# Patient Record
Sex: MALE | Race: WHITE | URBAN - METROPOLITAN AREA
[De-identification: names, ages, dates, MRNs, and addresses within clinical notes are randomized per-mention and may not be internally consistent; named-entity substitution may affect disease eponyms.]

---

## 2019-06-05 ENCOUNTER — OFFICE VISIT (OUTPATIENT)
Dept: FAMILY MEDICINE | Facility: CLINIC | Age: 59
End: 2019-06-05

## 2019-06-05 VITALS
HEART RATE: 60 BPM | BODY MASS INDEX: 23.13 KG/M2 | DIASTOLIC BLOOD PRESSURE: 64 MMHG | SYSTOLIC BLOOD PRESSURE: 102 MMHG | HEIGHT: 70 IN | WEIGHT: 161.6 LBS

## 2019-06-05 DIAGNOSIS — Z12.5 SCREENING FOR PROSTATE CANCER: ICD-10-CM

## 2019-06-05 DIAGNOSIS — Z29.9 ENCOUNTER FOR PREVENTIVE MEASURE: Primary | ICD-10-CM

## 2019-06-05 DIAGNOSIS — Z13.1 SCREENING FOR DIABETES MELLITUS: ICD-10-CM

## 2019-06-05 DIAGNOSIS — Z01.84 ANTIBODY RESPONSE EXAMINATION: ICD-10-CM

## 2019-06-05 DIAGNOSIS — Z13.220 SCREENING FOR LIPOID DISORDERS: ICD-10-CM

## 2019-06-05 DIAGNOSIS — N52.9 ERECTILE DYSFUNCTION, UNSPECIFIED ERECTILE DYSFUNCTION TYPE: ICD-10-CM

## 2019-06-05 DIAGNOSIS — R19.6 HALITOSIS: ICD-10-CM

## 2019-06-05 PROCEDURE — 99386 PREV VISIT NEW AGE 40-64: CPT | Performed by: FAMILY MEDICINE

## 2019-06-05 RX ORDER — TADALAFIL 20 MG/1
20 TABLET ORAL DAILY PRN
Qty: 15 TABLET | Refills: 11 | Status: SHIPPED | OUTPATIENT
Start: 2019-06-05 | End: 2021-03-09

## 2019-06-05 RX ORDER — CHLORHEXIDINE GLUCONATE ORAL RINSE 1.2 MG/ML
15 SOLUTION DENTAL 2 TIMES DAILY
Qty: 1893 ML | Refills: 11 | Status: SHIPPED | OUTPATIENT
Start: 2019-06-05 | End: 2020-10-14

## 2019-06-05 ASSESSMENT — MIFFLIN-ST. JEOR: SCORE: 1540.29

## 2019-06-05 NOTE — LETTER
[facility] [address] [place]  [address]  Phone: [phone]    Martha 10, 2019      Robles Chavez  [facility] [facility]  [place] [address]              Dear Robles,    The results from your recent visit showed elevation in your cholesterol compared to the previous measurements.   I would work hard on incorporating the mediterranean diet into your vernacular and repeat these tests in 6 months in the office.  Medication may be necessary if diet and exercise are not enough to lower this successfully.        Sincerely,         Michael Seth M.D.    Results for orders placed or performed in visit on 06/05/19   Comp. Metabolic Panel (14) ([facility])   Result Value Ref Range    Glucose 104 (H) 65 - 99 mg/dL    Urea Nitrogen 20 8 - 27 mg/dL    Creatinine 0.95 0.76 - 1.27 mg/dL    eGFR If NonAfricn Am 86 >59 mL/min/1.73    eGFR If Africn Am 99 >59 mL/min/1.73    BUN/Creatinine Ratio 21 10 - 24    Sodium 143 134 - 144 mmol/L    Potassium 4.5 3.5 - 5.2 mmol/L    Chloride 105 96 - 106 mmol/L    Total CO2 27 20 - 29 mmol/L    Calcium 9.3 8.6 - 10.2 mg/dL    Protein Total 6.8 6.0 - 8.5 g/dL    Albumin 4.7 3.6 - 4.8 g/dL    Globulin, Total 2.1 1.5 - 4.5 g/dL    A/G Ratio 2.2 1.2 - 2.2    Bilirubin Total 0.4 0.0 - 1.2 mg/dL    Alkaline Phosphatase 61 39 - 117 IU/L    AST 22 0 - 40 IU/L    ALT 27 0 - 44 IU/L    Narrative    Performed at:  01 - [facility] [address], [place], [place]  [id number]  : Nacho Martin MD, Phone:  [phone]   Lipid Panel ([facility])   Result Value Ref Range    Cholesterol 238 (H) 100 - 199 mg/dL    Triglycerides 63 0 - 149 mg/dL    HDL Cholesterol 67 >39 mg/dL    VLDL Cholesterol Syed 13 5 - 40 mg/dL    LDL Cholesterol Calculated 158 (H) 0 - 99 mg/dL    LDL/HDL Ratio 2.4 0.0 - 3.6 ratio    Narrative    Performed at:  01 - [facility]  [address] [facility], [place], [place]  [id number]  : Nacho Martin MD, Phone:  [phone]   Measles[facility]Mumps/Rubella  Immunity ([facility])   Result Value Ref Range    Rubella Antibody IgG 9.23 Immune >0.99 index    Rubeola IgG Antibody 100.0 Immune >29.9 AU/mL    Mumps IgG Antibody 87.1 Immune >10.9 AU/mL    Narrative    Performed at:  01 - [facility] [address], [place], [place]  [id number]  : Nacho Martin MD, Phone:  [phone]  Performed at:  02 - [address]  [id number]  : Rhett Santos MD, Phone:  [phone]   PSA Serum ([facility])   Result Value Ref Range    PSA NG/ML 0.7 0.0 - 4.0 ng/mL    Narrative    Performed at:  01 - [facility] [address], [place], [place]  [id number]  : Nacho Martin MD, Phone:  [phone]

## 2019-06-05 NOTE — PROGRESS NOTES
[place]  SUBJECTIVE:   CC: Robles Chavez is an 59 year old male who presents for preventive health visit.     Healthy Habits:    Do you get at least three servings of calcium containing foods daily (dairy, green leafy vegetables, etc.)? yes    Amount of exercise or daily activities, outside of work: 6 day(s) per week    Problems taking medications regularly not applicable    Medication side effects: No    Have you had an eye exam in the past two years? no    Do you see a dentist twice per year? no    Do you have sleep apnea, excessive snoring or daytime drowsiness?no  2  HEARING FREQUENCY TESTED BOTH EARS:Failed  Right Ear/Left Ear      500 Hz: passed/failed: pass    1000 Hz: Passed   2000 Hz: Passed   4000 Hz: Passed  Aixa Tang MA 6/5/2019    Hearing issues    Wants to be tested for measles immunity    Fasting labs    Today's PHQ-2 Score:   PHQ-2 ( 1999 Pfizer) 11/24/2014 4/3/2012   Q1: Little interest or pleasure in doing things 0 0   Q2: Feeling down, depressed or hopeless 0 0   PHQ-2 Score 0 0     Abuse: Current or Past(Physical, Sexual or Emotional)- No  Do you feel safe in your environment? No    Social History     Tobacco Use     Smoking status: Never Smoker     Smokeless tobacco: Never Used   Substance Use Topics     Alcohol use: Yes     Comment: every other night     If you drink alcohol do you typically have >3 drinks per day or >7 drinks per week? No                      Last PSA:   PSA   Date Value Ref Range Status   06/07/2010 0.40 0 - 4 ug/L Final       Reviewed orders with patient. Reviewed health maintenance and updated orders accordingly - Yes  Lab work is in process    Reviewed and updated as needed this visit by clinical staff       Reviewed and updated as needed this visit by Provider        ROS:  CONSTITUTIONAL: NEGATIVE for fever, chills, change in weight  INTEGUMENTARY/SKIN: scaling L elbow  EYES: NEGATIVE for vision changes or irritation  ENT: NEGATIVE for ear, mouth and throat  "problems   + for halitosis  RESP: NEGATIVE for significant cough or SOB  CV: NEGATIVE for chest pain, palpitations or peripheral edema and POSITIVE for R chest wall pain that started with working out (injury) and is perturbed as such  GI: NEGATIVE for nausea, abdominal pain, heartburn, or change in bowel habits   male: negative for dysuria, hematuria, decreased urinary stream,  + for erectile dysfunction  MUSCULOSKELETAL:NEGATIVE for significant arthralgias or myalgia and POSITIVE  for catching R middle finger  NEURO: NEGATIVE for weakness, dizziness or paresthesias  PSYCHIATRIC: NEGATIVE for changes in mood or affect    OBJECTIVE:   /64   Pulse 60   Ht 1.772 m (5' 9.75\")   Wt 73.3 kg (161 lb 9.6 oz)   BMI 23.35 kg/m    EXAM:  GENERAL: healthy, alert and no distress  EYES: Eyes grossly normal to inspection, PERRL and conjunctivae and sclerae normal  HENT: ear canals and TM's normal, nose and mouth without ulcers or lesions  NECK: no adenopathy, no asymmetry, masses, or scars and thyroid normal to palpation  RESP: lungs clear to auscultation - no rales, rhonchi or wheezes  CV: regular rate and rhythm, normal S1 S2, no S3 or S4, no murmur, click or rub, no peripheral edema and peripheral pulses strong  ABDOMEN: soft, nontender, no hepatosplenomegaly, no masses and bowel sounds normal  MS: catching R middle finger without MCP pain   Strength intact  SKIN: scaly rash, small, L elbow  NEURO: Normal strength and tone, mentation intact and speech normal  PSYCH: mentation appears normal, affect normal/bright    Diagnostic Test Results:  Labs reviewed in Epic    ASSESSMENT/PLAN:       ICD-10-CM    1. Encounter for preventive measure Z29.9    2. Screening for lipoid disorders Z13.220 Lipid Panel ([facility])   3. Screening for diabetes mellitus Z13.1 Comp. Metabolic Panel (14) ([facility])   4. Antibody response examination Z01.84 Measles/Mumps/Rubella Immunity ([facility])   5. Halitosis R19.6 chlorhexidine (PERIDEX) 0.12 " "% solution   6. Screening for prostate cancer Z12.5 PSA Serum ([facility])   7. Erectile dysfunction, unspecified erectile dysfunction type N52.9 tadalafil (CIALIS) 20 MG tablet     COUNSELING:  Reviewed preventive health counseling, as reflected in patient instructions  Special attention given to:        Regular exercise       Healthy diet/nutrition       Vision screening       Hearing screening    Estimated body mass index is 23.48 kg/m  as calculated from the following:    Height as of 1/12/15: 1.753 m (5' 9\").    Weight as of 1/12/15: 72.1 kg (159 lb).     reports that he has never smoked. He has never used smokeless tobacco.      cologuard ordered for screening    Counseling Resources:  ATP IV Guidelines  Pooled Cohorts Equation Calculator  FRAX Risk Assessment  ICSI Preventive Guidelines  Dietary Guidelines for Americans, 2010  USDA's MyPlate  ASA Prophylaxis  Lung CA Screening    Michael Seth MD  [facility]  "

## 2019-06-07 LAB
ALBUMIN SERPL-MCNC: 4.7 G/DL (ref 3.6–4.8)
ALBUMIN/GLOB SERPL: 2.2 {RATIO} (ref 1.2–2.2)
ALP SERPL-CCNC: 61 IU/L (ref 39–117)
ALT SERPL-CCNC: 27 IU/L (ref 0–44)
AST SERPL-CCNC: 22 IU/L (ref 0–40)
BILIRUB SERPL-MCNC: 0.4 MG/DL (ref 0–1.2)
BUN SERPL-MCNC: 20 MG/DL (ref 8–27)
BUN/CREATININE RATIO: 21 (ref 10–24)
CALCIUM SERPL-MCNC: 9.3 MG/DL (ref 8.6–10.2)
CHLORIDE SERPLBLD-SCNC: 105 MMOL/L (ref 96–106)
CHOLEST SERPL-MCNC: 238 MG/DL (ref 100–199)
CREAT SERPL-MCNC: 0.95 MG/DL (ref 0.76–1.27)
EGFR IF AFRICN AM: 99 ML/MIN/1.73
EGFR IF NONAFRICN AM: 86 ML/MIN/1.73
GLOBULIN, TOTAL: 2.1 G/DL (ref 1.5–4.5)
GLUCOSE SERPL-MCNC: 104 MG/DL (ref 65–99)
HDLC SERPL-MCNC: 67 MG/DL
LDL/HDL RATIO: 2.4 RATIO (ref 0–3.6)
LDLC SERPL CALC-MCNC: 158 MG/DL (ref 0–99)
MUV IGG SER QL IA: 87.1 AU/ML
POTASSIUM SERPL-SCNC: 4.5 MMOL/L (ref 3.5–5.2)
PROT SERPL-MCNC: 6.8 G/DL (ref 6–8.5)
PSA NG/ML: 0.7 NG/ML (ref 0–4)
RUBELLA ANTIBODY IGG: 9.23 INDEX
RUBEOLA IGG ANTIBODY: 100 AU/ML
SODIUM SERPL-SCNC: 143 MMOL/L (ref 134–144)
TOTAL CO2: 27 MMOL/L (ref 20–29)
TRIGL SERPL-MCNC: 63 MG/DL (ref 0–149)
VLDLC SERPL CALC-MCNC: 13 MG/DL (ref 5–40)

## 2019-08-19 ENCOUNTER — TRANSFERRED RECORDS (OUTPATIENT)
Dept: FAMILY MEDICINE | Facility: CLINIC | Age: 59
End: 2019-08-19

## 2019-10-02 ENCOUNTER — HEALTH MAINTENANCE LETTER (OUTPATIENT)
Age: 59
End: 2019-10-02

## 2020-10-12 ENCOUNTER — NURSE TRIAGE (OUTPATIENT)
Dept: URGENT CARE | Facility: URGENT CARE | Age: 60
End: 2020-10-12

## 2020-10-12 NOTE — TELEPHONE ENCOUNTER
Symptoms    Describe your symptoms: Lower right back pain    Any pain: Yes: dull, but in last couple of day it is producing sharp pain    How long have you been having symptoms: one week      Have you been seen for this:  No    Appointment offered?: No    Triage offered?: Yes: sending encounter    Home remedies tried: Doyle    Requested Pharmacy: [id number] - [place] - [address] AT [facility]        Okay to leave a detailed message? Yes at Home number on file [phone] (home)
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
## 2020-10-14 ENCOUNTER — OFFICE VISIT (OUTPATIENT)
Dept: PEDIATRICS | Facility: CLINIC | Age: 60
End: 2020-10-14
Payer: COMMERCIAL

## 2020-10-14 VITALS
SYSTOLIC BLOOD PRESSURE: 116 MMHG | WEIGHT: 168 LBS | HEART RATE: 64 BPM | TEMPERATURE: 96 F | OXYGEN SATURATION: 98 % | DIASTOLIC BLOOD PRESSURE: 72 MMHG | BODY MASS INDEX: 24.28 KG/M2 | RESPIRATION RATE: 16 BRPM
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
## 2020-10-14 DIAGNOSIS — M54.50 ACUTE RIGHT-SIDED LOW BACK PAIN WITHOUT SCIATICA: Primary | ICD-10-CM

## 2020-10-14 PROCEDURE — 99213 OFFICE O/P EST LOW 20 MIN: CPT | Performed by: PHYSICIAN ASSISTANT
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
## 2021-01-15 ENCOUNTER — HEALTH MAINTENANCE LETTER (OUTPATIENT)
Age: 61
End: 2021-01-15

## 2021-01-26 ENCOUNTER — OFFICE VISIT (OUTPATIENT)
Dept: FAMILY MEDICINE | Facility: CLINIC | Age: 61
End: 2021-01-26

## 2021-01-26 VITALS
BODY MASS INDEX: 24.42 KG/M2 | HEART RATE: 66 BPM | SYSTOLIC BLOOD PRESSURE: 114 MMHG | DIASTOLIC BLOOD PRESSURE: 80 MMHG | TEMPERATURE: 98.1 F | HEIGHT: 70 IN | WEIGHT: 170.6 LBS | OXYGEN SATURATION: 94 %

## 2021-01-26 DIAGNOSIS — R04.0 EPISTAXIS: Primary | ICD-10-CM

## 2021-01-26 DIAGNOSIS — Z82.49 FAMILY HISTORY OF ISCHEMIC HEART DISEASE: ICD-10-CM

## 2021-01-26 PROCEDURE — 99213 OFFICE O/P EST LOW 20 MIN: CPT | Performed by: FAMILY MEDICINE

## 2021-01-26 RX ORDER — AMPICILLIN TRIHYDRATE 250 MG
600 CAPSULE ORAL DAILY
COMMUNITY

## 2021-01-26 RX ORDER — SODIUM PHOSPHATE,MONO-DIBASIC 19G-7G/118
500 ENEMA (ML) RECTAL DAILY
COMMUNITY

## 2021-01-26 RX ORDER — ASPIRIN 81 MG/1
81 TABLET ORAL DAILY
Qty: 90 TABLET | Refills: 0
Start: 2021-01-26

## 2021-01-26 ASSESSMENT — MIFFLIN-ST. JEOR: SCORE: 1580.09

## 2021-01-26 NOTE — PROGRESS NOTES
"  Tanmay Arboleda is a 60 year old patient who presents to clinic for recurrent nose bleeds.  Has had about 3 nose bleeds on right nose over past week or two.  No trauma.  No fever or chills.  No history of nose bleeds.  Takes aspirin 325 as prophylaxis.  Family hx reviewed.  No bleeding today      Review of Systems   Constitutional, HEENT, cardiovascular, pulmonary, gi and gu systems are negative, except as otherwise noted.      Objective    /80   Pulse 66   Temp 98.1  F (36.7  C)   Ht 1.778 m (5' 10\")   Wt 77.4 kg (170 lb 9.6 oz)   SpO2 94%   BMI 24.48 kg/m      General: Well appearing, NAD  HEENT: small scab right anterior septum without active bleeding.  Left is clear  Psych: normal mood and affect      Assessment & Plan     Recurrent epistaxis: no active bleeding.  Advise vaseline, reduce asa to 81 mg, afrin x 3 days, and humidifier.  Follow up if [id number]       See Patient Instructions    Return in about 3 months (around 4/26/2021) for Physical Exam.    Soham Baptiste MD  [facility]          "

## 2021-01-26 NOTE — PATIENT INSTRUCTIONS
Switch to aspirin 81 mg    Apply vaseline 1-2 times per day    Use humidifier in bedroom    AFRIN: INSTILL 2 SQUIRTS INTO BOTH NOSTRILS TWICE DAILY FOR 3 DAYS OR LESS

## 2021-03-09 ENCOUNTER — OFFICE VISIT (OUTPATIENT)
Dept: FAMILY MEDICINE | Facility: CLINIC | Age: 61
End: 2021-03-09

## 2021-03-09 VITALS
HEIGHT: 70 IN | TEMPERATURE: 98 F | DIASTOLIC BLOOD PRESSURE: 76 MMHG | SYSTOLIC BLOOD PRESSURE: 110 MMHG | BODY MASS INDEX: 24.07 KG/M2 | OXYGEN SATURATION: 96 % | HEART RATE: 60 BPM | WEIGHT: 168.13 LBS | RESPIRATION RATE: 16 BRPM

## 2021-03-09 DIAGNOSIS — Z12.5 PROSTATE CANCER SCREENING: ICD-10-CM

## 2021-03-09 DIAGNOSIS — Z00.00 ROUTINE GENERAL MEDICAL EXAMINATION AT A HEALTH CARE FACILITY: Primary | ICD-10-CM

## 2021-03-09 DIAGNOSIS — Z13.0 ENCOUNTER FOR SCREENING FOR HEMATOLOGIC DISORDER: ICD-10-CM

## 2021-03-09 DIAGNOSIS — R73.01 IFG (IMPAIRED FASTING GLUCOSE): ICD-10-CM

## 2021-03-09 DIAGNOSIS — Z11.59 NEED FOR HEPATITIS C SCREENING TEST: ICD-10-CM

## 2021-03-09 DIAGNOSIS — Z13.228 ENCOUNTER FOR SCREENING FOR METABOLIC DISORDER: ICD-10-CM

## 2021-03-09 DIAGNOSIS — E78.5 DYSLIPIDEMIA: ICD-10-CM

## 2021-03-09 LAB
% GRANULOCYTES: 61 % (ref 42.2–75.2)
HCT VFR BLD AUTO: 46.2 % (ref 39–51)
HEMOGLOBIN: 15.5 G/DL (ref 13.4–17.5)
LYMPHOCYTES NFR BLD AUTO: 30.5 % (ref 20.5–51.1)
MCH RBC QN AUTO: 31.3 PG (ref 27–31)
MCHC RBC AUTO-ENTMCNC: 33.5 G/DL (ref 33–37)
MCV RBC AUTO: 93.2 FL (ref 80–100)
MONOCYTES NFR BLD AUTO: 8.5 % (ref 1.7–9.3)
PLATELET # BLD AUTO: 301 K/UL (ref 140–450)
RBC # BLD AUTO: 4.96 X10/CMM (ref 4.2–5.9)
WBC # BLD AUTO: 5.6 X10/CMM (ref 3.8–11)

## 2021-03-09 PROCEDURE — 99396 PREV VISIT EST AGE 40-64: CPT | Performed by: FAMILY MEDICINE

## 2021-03-09 PROCEDURE — 85025 COMPLETE CBC W/AUTO DIFF WBC: CPT | Performed by: FAMILY MEDICINE

## 2021-03-09 PROCEDURE — 36415 COLL VENOUS BLD VENIPUNCTURE: CPT | Performed by: FAMILY MEDICINE

## 2021-03-09 ASSESSMENT — MIFFLIN-ST. JEOR: SCORE: 1568.86

## 2021-03-09 NOTE — PROGRESS NOTES
3  SUBJECTIVE:   CC: Robles Chavez is an 60 year old male who presents for preventive health visit.     Doing well.  No specific concerns.  Started red yeast rice since last lipid panel.  Exercises regularly.    Patient has been advised of split billing requirements and indicates understanding:   Healthy Habits:    Do you get at least three servings of calcium containing foods daily (dairy, green leafy vegetables, etc.)? yes    Amount of exercise or daily activities, outside of work: 3 day(s) per week    Problems taking medications regularly No    Medication side effects: No    Have you had an eye exam in the past two years? yes    Do you see a dentist twice per year? No, oonce    Do you have sleep apnea, excessive snoring or daytime drowsiness?no          Today's PHQ-2 Score:   PHQ-2 ( 1999 Pfizer) 6/5/2019 11/24/2014   Q1: Little interest or pleasure in doing things 0 0   Q2: Feeling down, depressed or hopeless 0 0   PHQ-2 Score 0 0       Abuse: Current or Past(Physical, Sexual or Emotional)- No  Do you feel safe in your environment? Yes    Have you ever done Advance Care Planning? (For example, a Health Directive, POLST, or a discussion with a medical provider or your loved ones about your wishes): No, advance care planning information given to patient to review.  Patient plans to discuss their wishes with loved ones or provider.      Social History     Tobacco Use     Smoking status: Never Smoker     Smokeless tobacco: Never Used   Substance Use Topics     Alcohol use: Yes     Comment: every other night     If you drink alcohol do you typically have >3 drinks per day or >7 drinks per week? No                      Last PSA:   PSA   Date Value Ref Range Status   06/07/2010 0.40 0 - 4 ug/L Final       Reviewed orders with patient. 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     PONV (postoperative nausea and vomiting)      Venereal wart 10/29/2014      Past Surgical History:   Procedure Laterality Date     ARTHROSCOPIC RECONSTRUCTION ANTERIOR CRUCIATE LIGAMENT  4/12/2012    Procedure:ARTHROSCOPIC RECONSTRUCTION ANTERIOR CRUCIATE LIGAMENT; Left Knee Arthroscopy, Arthroscopic Anterior Cruciate Ligament  Reconstruction with Hamstring Autograft.; Surgeon:LITA HAYNES; Location:[facility]     HERNIA REPAIR       SHOULDER SURGERY Right ~2000    recurrent dislocation       ROS:  CONSTITUTIONAL: NEGATIVE for fever, chills, change in weight  INTEGUMENTARY/SKIN: NEGATIVE for worrisome rashes, moles or lesions  EYES: NEGATIVE for vision changes or irritation  ENT: NEGATIVE for ear, mouth and throat problems  RESP: NEGATIVE for significant cough or SOB  CV: NEGATIVE for chest pain, palpitations or peripheral edema  GI: NEGATIVE for nausea, abdominal pain, heartburn, or change in bowel habits   male: negative for dysuria, hematuria, decreased urinary stream, erectile dysfunction, urethral discharge  MUSCULOSKELETAL: NEGATIVE for significant arthralgias or myalgia  NEURO: NEGATIVE for weakness, dizziness or paresthesias  PSYCHIATRIC: NEGATIVE for changes in mood or affect    OBJECTIVE:   There were no vitals taken for this visit.  EXAM:  GENERAL: healthy, alert and no distress  EYES: Eyes grossly normal to inspection, PERRL and conjunctivae and sclerae normal  HENT: ear canals and TM's normal, nose and mouth without ulcers or lesions  NECK: no adenopathy, no asymmetry, masses, or scars and thyroid normal to palpation  RESP: lungs clear to auscultation - no rales, rhonchi or  "wheezes  CV: regular rate and rhythm, normal S1 S2, no S3 or S4, no murmur, click or rub, no peripheral edema and peripheral pulses strong  ABDOMEN: soft, nontender, no hepatosplenomegaly, no masses and bowel sounds normal  RECTAL: normal sphincter tone, no rectal masses, prostate normal size, smooth, nontender without nodules or masses  MS: no gross musculoskeletal defects noted, no edema  SKIN: no suspicious lesions or rashes  NEURO: Normal strength and tone, mentation intact and speech normal  PSYCH: mentation appears normal, affect normal/bright    Diagnostic Test Results:  Labs reviewed in Epic    ASSESSMENT/PLAN:   1. Routine general medical examination at a health care facility  - discussed preventative guidelines, healthy diet, exercise and weight management    2. Dyslipidemia  - Repeat CAC score to see if need to switch to traditional statin  - Lipid Panel ([facility])  - [facility]  - VENOUS COLLECTION    3. Encounter for screening for metabolic disorder  - Comp. Metabolic Panel (14) ([facility])  - VENOUS COLLECTION    4. Encounter for screening for hematologic disorder  - CBC with Diff/Plt ([facility])  - VENOUS COLLECTION    5. Need for hepatitis C screening test  - HCV Antibody ([facility])  - VENOUS COLLECTION    6. Prostate cancer screening  - We had a thorough discussion of the potential harms and benefits of PSA screening and he has elected to proceed with screening after discussing current guidelines and evidence  - PSA Total (Reflex To Free) ([facility])  - VENOUS COLLECTION    7. IFG (impaired fasting glucose)  - VENOUS COLLECTION  - Hemoglobin A1C ([facility])    Patient has been advised of split billing requirements and indicates understanding: Yes  COUNSELING:  Reviewed preventive health counseling, as reflected in patient instructions    Estimated body mass index is 24.48 kg/m  as calculated from the following:    Height as of 1/26/21: 1.778 m (5' 10\").    Weight as of 1/26/21: 77.4 kg (170 lb " 9.6 oz).        He reports that he has never smoked. He has never used smokeless tobacco.      Counseling Resources:  ATP IV Guidelines  Pooled Cohorts Equation Calculator  FRAX Risk Assessment  ICSI Preventive Guidelines  Dietary Guidelines for Americans, 2010  USDA's MyPlate  ASA Prophylaxis  Lung CA Screening    Soham Baptiste MD  [facility]

## 2021-03-10 LAB
.: NORMAL
ALBUMIN SERPL-MCNC: 4.6 G/DL (ref 3.8–4.8)
ALBUMIN/GLOB SERPL: 2.4 {RATIO} (ref 1.2–2.2)
ALP SERPL-CCNC: 81 IU/L (ref 39–117)
ALT SERPL-CCNC: 29 IU/L (ref 0–44)
AST SERPL-CCNC: 22 IU/L (ref 0–40)
BILIRUB SERPL-MCNC: 0.5 MG/DL (ref 0–1.2)
BUN SERPL-MCNC: 14 MG/DL (ref 8–27)
BUN/CREATININE RATIO: 16 (ref 10–24)
CALCIUM SERPL-MCNC: 9 MG/DL (ref 8.6–10.2)
CHLORIDE SERPLBLD-SCNC: 105 MMOL/L (ref 96–106)
CHOLEST SERPL-MCNC: 226 MG/DL (ref 100–199)
CREAT SERPL-MCNC: 0.89 MG/DL (ref 0.76–1.27)
EGFR IF AFRICN AM: 106 ML/MIN/1.73
EGFR IF NONAFRICN AM: 92 ML/MIN/1.73
GLOBULIN, TOTAL: 1.9 G/DL (ref 1.5–4.5)
GLUCOSE SERPL-MCNC: 92 MG/DL (ref 65–99)
HBA1C MFR BLD: 5.7 % (ref 4.8–5.6)
HCV AB SERPL QL IA: <0.1 S/CO RATIO (ref 0–0.9)
HDLC SERPL-MCNC: 64 MG/DL
LDL/HDL RATIO: 2.3 RATIO (ref 0–3.6)
LDLC SERPL CALC-MCNC: 146 MG/DL (ref 0–99)
POTASSIUM SERPL-SCNC: 4.5 MMOL/L (ref 3.5–5.2)
PROT SERPL-MCNC: 6.5 G/DL (ref 6–8.5)
PSA NG/ML: 0.6 NG/ML (ref 0–4)
SODIUM SERPL-SCNC: 142 MMOL/L (ref 134–144)
TOTAL CO2: 25 MMOL/L (ref 20–29)
TRIGL SERPL-MCNC: 93 MG/DL (ref 0–149)
VLDLC SERPL CALC-MCNC: 16 MG/DL (ref 5–40)

## 2021-03-11 ENCOUNTER — TRANSFERRED RECORDS (OUTPATIENT)
Dept: FAMILY MEDICINE | Facility: CLINIC | Age: 61
End: 2021-03-11

## 2021-03-11 PROBLEM — R73.03 PREDIABETES: Status: ACTIVE | Noted: 2021-03-11

## 2021-09-05 ENCOUNTER — HEALTH MAINTENANCE LETTER (OUTPATIENT)
Age: 61
End: 2021-09-05

## 2022-04-17 ENCOUNTER — HEALTH MAINTENANCE LETTER (OUTPATIENT)
Age: 62
End: 2022-04-17

## 2022-10-22 ENCOUNTER — HEALTH MAINTENANCE LETTER (OUTPATIENT)
Age: 62
End: 2022-10-22

## 2023-06-18 ENCOUNTER — HEALTH MAINTENANCE LETTER (OUTPATIENT)
Age: 63
End: 2023-06-18